# Patient Record
Sex: MALE
[De-identification: names, ages, dates, MRNs, and addresses within clinical notes are randomized per-mention and may not be internally consistent; named-entity substitution may affect disease eponyms.]

---

## 2017-03-14 ENCOUNTER — CHARTING TRANS (OUTPATIENT)
Dept: OTHER | Age: 32
End: 2017-03-14

## 2024-04-16 ENCOUNTER — OFFICE VISIT (OUTPATIENT)
Dept: OTOLARYNGOLOGY | Facility: CLINIC | Age: 39
End: 2024-04-16

## 2024-04-16 VITALS — WEIGHT: 315 LBS | BODY MASS INDEX: 40.43 KG/M2 | HEIGHT: 74 IN

## 2024-04-16 DIAGNOSIS — J34.3 HYPERTROPHY OF BOTH INFERIOR NASAL TURBINATES: ICD-10-CM

## 2024-04-16 DIAGNOSIS — J30.9 CHRONIC ALLERGIC RHINITIS: ICD-10-CM

## 2024-04-16 DIAGNOSIS — J34.2 NASAL SEPTAL DEVIATION: ICD-10-CM

## 2024-04-16 DIAGNOSIS — R06.83 SNORING: ICD-10-CM

## 2024-04-16 DIAGNOSIS — J34.89 NASAL OBSTRUCTION: ICD-10-CM

## 2024-04-16 DIAGNOSIS — R09.81 NASAL CONGESTION: ICD-10-CM

## 2024-04-16 DIAGNOSIS — J38.7 ULCER OF EPIGLOTTIS: Primary | ICD-10-CM

## 2024-04-16 PROCEDURE — 31575 DIAGNOSTIC LARYNGOSCOPY: CPT | Performed by: STUDENT IN AN ORGANIZED HEALTH CARE EDUCATION/TRAINING PROGRAM

## 2024-04-16 PROCEDURE — 99204 OFFICE O/P NEW MOD 45 MIN: CPT | Performed by: STUDENT IN AN ORGANIZED HEALTH CARE EDUCATION/TRAINING PROGRAM

## 2024-04-16 RX ORDER — MONTELUKAST SODIUM 10 MG/1
10 TABLET ORAL NIGHTLY
Qty: 30 TABLET | Refills: 3 | Status: SHIPPED | OUTPATIENT
Start: 2024-04-16

## 2024-04-16 RX ORDER — AZELASTINE 1 MG/ML
2 SPRAY, METERED NASAL 2 TIMES DAILY
Qty: 30 ML | Refills: 0 | Status: SHIPPED | OUTPATIENT
Start: 2024-04-16

## 2024-04-16 NOTE — PROGRESS NOTES
Nick Whatley is a 38 year old male.   Chief Complaint   Patient presents with    Throat Problem     Pt states persistent throat pain.       ASSESSMENT AND PLAN:   1. Ulcer of epiglottis  38-year-old presents with throat pain that has been going on for several weeks although recently getting much better.  Still feels a little soreness in the back of his throat noted he had ulcers that are now resolved and his mouth.  Reports chronic snoring and nasal congestion.  This is affecting his sleep his wife says that he snores.  He has been using Ocean mist nasal spray    On flexible laryngoscopy there is a superficial ulcer at the tip of his epiglottis on the left.  No other abnormal lesions seen of his oropharynx or larynx.  He has quite deviated septum to the right with a spur in contact with the middle turbinate.  Also has inferior turbinate hypertrophy on each side with evidence of allergic mucin    Appears that he may have had a viral laryngitis or pharyngitis that is mostly resolved still with a slight ulcer of his epiglottis.  Will currently observe.  Also has chronic snoring and nasal obstruction with regards to his septal deviation and turbinate hypertrophy.  Will trial a course of Singulair, Claritin-D and Astelin for likely contributing allergies.  Discussed the utility of surgery in detail and he can return in 3 to 4 weeks if still not improving to consider surgery    2. Snoring      3. Nasal septal deviation      4. Hypertrophy of both inferior nasal turbinates      5. Nasal congestion      6. Nasal obstruction    - loratadine-pseudoephedrine ER  MG Oral Tablet 24 Hr; Take 1 tablet by mouth at bedtime.  Dispense: 30 tablet; Refill: 1    7. Chronic allergic rhinitis        The patient indicates understanding of these issues and agrees to the plan.      EXAM:   Ht 6' 2\" (1.88 m)   Wt (!) 320 lb (145.2 kg)   BMI 41.09 kg/m²     Pertinent exam findings may also be noted above in assessment and plan      System Details   Skin Inspection - Normal.   Constitutional Overall appearance - Normal.   Head/Face Symmetric, TMJ tenderness not present    Eyes EOMI, PERRL   Right ear:  Canal clear, TM intact, no HARIS   Left ear:  Canal clear, TM intact, no HARIS   Nose: Septum midline, inferior turbinates not enlarged, nasal valves without collapse    Oral cavity/Oropharynx: No lesions or masses on inspection or palpation, tonsils symmetric    Neck: Soft without LAD, thyroid not enlarged  Voice clear/ no stridor   Other:      Scopes and Procedures:       Flexible Laryngoscopy Procedure Note (48066)    Due to inability for adequate examination of the larynx and need for magnification to perform the examination, endoscopy was performed.  Risks and benefits were discussed with patient/family and they have given verbal consent to proceed.    Pre-operative Diagnosis:   1. Ulcer of epiglottis    2. Snoring    3. Nasal septal deviation    4. Hypertrophy of both inferior nasal turbinates    5. Nasal congestion    6. Nasal obstruction    7. Chronic allergic rhinitis        Post-operative Diagnosis: Same    Procedure: Diagnostic flexible fiberoptic laryngoscopy    Anesthesia: Topical anesthetic [place]     Surgeon Jose Bean MD    EBL: 0cc    Procedure Detail & Findings:     After placement of topical anesthetic intranasally the flexible laryngoscope was inserted into the nare and driven through the nasal cavity with no significant abnormal findings noted in the nasal cavities or nasopharynx. The oropharynx, hypopharynx and larynx were subsequently examined and the following findings were noted:        Base of Tongue: Normal        Valeculla: Normal        Arytenoids: Normal/Erythemous: Normal        Introitus of the esophagus: Normal        Epiglottis: On flexible laryngoscopy there is a superficial ulcer at the tip of his epiglottis on the left.         False vocal cords: Normal        True vocal cords: Normal        Subglottic space:  Normal        Mobility of True vocal cords: Normal    Condition: Stable    Complications: Patient tolerated the procedure well with no immediate complication.    Jose Bean MD        Current Outpatient Medications   Medication Sig Dispense Refill    loratadine-pseudoephedrine ER  MG Oral Tablet 24 Hr Take 1 tablet by mouth at bedtime. 30 tablet 1    azelastine 0.1 % Nasal Solution 2 sprays by Nasal route 2 (two) times daily. 30 mL 0    montelukast 10 MG Oral Tab Take 1 tablet (10 mg total) by mouth nightly. 30 tablet 3      History reviewed. No pertinent past medical history.   Social History:  Social History     Socioeconomic History    Marital status: Unknown   Tobacco Use    Smoking status: Never    Smokeless tobacco: Never   Substance and Sexual Activity    Alcohol use: Not Currently          Jose Bean MD  4/16/2024  9:10 AM

## 2024-04-23 ENCOUNTER — TELEPHONE (OUTPATIENT)
Dept: SURGERY | Facility: CLINIC | Age: 39
End: 2024-04-23

## 2024-05-10 ENCOUNTER — OFFICE VISIT (OUTPATIENT)
Dept: SURGERY | Facility: CLINIC | Age: 39
End: 2024-05-10

## 2024-05-10 DIAGNOSIS — R31.0 GROSS HEMATURIA: Primary | ICD-10-CM

## 2024-05-10 PROCEDURE — 99203 OFFICE O/P NEW LOW 30 MIN: CPT | Performed by: UROLOGY

## 2024-05-10 NOTE — PROGRESS NOTES
Sheila Manning MD  Department of Urology  [address] [place] [address] [address]  [place] [address]    T: [phone]  F: [phone]    To: No primary care provider on file.   No primary provider on file.    Re: Nick Whatley   MRN: [id number]  : 10/24/1985    Dear No primary care provider on file.,    Today I had the pleasure of seeing Nick Whatley in my clinic. As you know, Mr. Whatley is a pleasant 38 year old year old male who I am seeing for hematuria. Patient was last seen in this department on Visit date not found.    Briefly, patient states that he saw blood in his urine.       PAST MEDICAL HISTORY:  No past medical history on file.     PAST SURGICAL HISTORY:  No past surgical history on file.      ALLERGIES:  No Known Allergies      MEDICATIONS:  Current Outpatient Medications   Medication Instructions    azelastine 0.1 % Nasal Solution 2 sprays, Nasal, 2 times daily    loratadine-pseudoephedrine ER  MG Oral Tablet 24 Hr 1 tablet, Oral, Nightly    montelukast (SINGULAIR) 10 mg, Oral, Nightly        FAMILY HISTORY:  No family history on file.     SOCIAL HISTORY:  Social History     Socioeconomic History    Marital status: Unknown   Tobacco Use    Smoking status: Never    Smokeless tobacco: Never   Substance and Sexual Activity    Alcohol use: Not Currently          PHYSICAL EXAMINATION:  There were no vitals filed for this visit.  CONSTITUTIONAL: No apparent distress, cooperative and communicative  NEUROLOGIC: Alert and oriented   HEAD: Normocephalic, atraumatic   EYES: Sclera non-icteric   ENT: Hearing intact, moist mucous membranes   NECK: No obvious goiter or masses   RESPIRATORY: Normal respiratory effort, Nonlabored breathing on room air  SKIN: No evident rashes   ABDOMEN: Soft, nontender, nondistended, no rebound tenderness, no guarding, no masses      REVIEW OF SYSTEMS:    A comprehensive 10-point review of systems was completed.  Pertinent positives and negatives are noted  in the the HPI.       LABORATORY DATA:  none        IMAGING REVIEW:  one     OTHER RELEVANT DATA:   none     IMPRESSION: Hematuria-recommended cystoscopy, CT urogram and cytology.    I discussed hematuria in detail both gross and microscopic.  I mentioned that it can come from anywhere the urine touches in the urinary tract including the kidneys, ureters, urethra, prostate in men, vagina/uterus in women.  I mention that there are benign causes like kidney stones, trauma, heavy exercise, idiopathic hematuria as well as more concerning causes like a kidney tumor, ureteral tumor or bladder tumor.        PLAN:  Cystoscopy  CT urogram  Cytology    Thank you for referring this very pleasant patient to my clinic. If you have any questions or concerns, please do not hesitate to contact me.    Sincerely,  Sheila Manning MD    30 minutes were spent on this patient at this visit obtaining a history, reviewing medical records, developing a treatment plan, counseling and discussing treatment strategy with patient, coordination of care and documentation.     The 21st Century Cures Act makes medical notes available to patients in the interest of transparency.  However, please be advised that this is a medical document.  It is intended as a peer to peer communication.  It is written in medical language and may contain abbreviations or verbiage that are technical and unfamiliar.  It may appear blunt or direct.  Medical documents are intended to carry relevant information, facts as evident, and the clinical opinion of the practitioner.

## 2024-05-13 ENCOUNTER — TELEPHONE (OUTPATIENT)
Dept: SURGERY | Facility: CLINIC | Age: 39
End: 2024-05-13

## 2024-05-13 LAB — NON GYNE INTERPRETATION: NEGATIVE

## 2024-05-13 NOTE — TELEPHONE ENCOUNTER
----- Message from Sheila Manning sent at 5/13/2024 10:39 AM CDT -----  Can you please tell patient his cytology is negative. We will discuss further at his cysto

## 2024-05-13 NOTE — TELEPHONE ENCOUNTER
Called patient today 2024 and patient verified Name and . Patient was given negative cytology urine sample results. Patient understood conversation.

## 2024-05-15 ENCOUNTER — TELEPHONE (OUTPATIENT)
Dept: SURGERY | Facility: CLINIC | Age: 39
End: 2024-05-15

## 2024-05-15 NOTE — TELEPHONE ENCOUNTER
Received approval for Cpt 80212 Ct scan of abdomen and pelvis from [facility].  Request ID: [id number]  Approval valid from/to: 05/14/2024 to 06/28/2024

## 2024-05-16 NOTE — TELEPHONE ENCOUNTER
Received fax from [facility] with approval of CPT: 94036 (3D) pictures. Approved from 5/15/24-6/29/24. Will send copy to scanning.

## 2024-05-22 ENCOUNTER — HOSPITAL ENCOUNTER (OUTPATIENT)
Dept: CT IMAGING | Age: 39
Discharge: HOME | End: 2024-05-22
Attending: UROLOGY

## 2024-05-22 ENCOUNTER — HOSPITAL ENCOUNTER (OUTPATIENT)
Dept: CT IMAGING | Facility: HOSPITAL | Age: 39
End: 2024-05-22
Attending: UROLOGY

## 2024-05-22 DIAGNOSIS — R31.0 GROSS HEMATURIA: ICD-10-CM

## 2024-05-22 LAB
CREAT BLD-MCNC: 1 MG/DL
EGFRCR SERPLBLD CKD-EPI 2021: 99 ML/MIN/1.73M2 (ref 60–?)

## 2024-05-22 PROCEDURE — 74178 CT ABD&PLV WO CNTR FLWD CNTR: CPT | Performed by: UROLOGY

## 2024-05-22 PROCEDURE — 82565 ASSAY OF CREATININE: CPT

## 2024-05-22 PROCEDURE — 76377 3D RENDER W/INTRP POSTPROCES: CPT | Performed by: UROLOGY

## 2024-06-04 ENCOUNTER — PROCEDURE (OUTPATIENT)
Dept: SURGERY | Facility: CLINIC | Age: 39
End: 2024-06-04

## 2024-06-04 VITALS
DIASTOLIC BLOOD PRESSURE: 80 MMHG | SYSTOLIC BLOOD PRESSURE: 124 MMHG | BODY MASS INDEX: 40.43 KG/M2 | WEIGHT: 315 LBS | HEIGHT: 74 IN

## 2024-06-04 DIAGNOSIS — R31.0 GROSS HEMATURIA: Primary | ICD-10-CM

## 2024-06-04 PROCEDURE — 52000 CYSTOURETHROSCOPY: CPT | Performed by: UROLOGY

## 2024-06-04 NOTE — PROCEDURES
CYSTOSCOPY (MALE)    PRE-OP DIAGNOSIS: gross hematuria    POST-OP DIAGNOSIS: same    PROCEDURE: Cystsocopy    SURGEON: Sheila Manning MD    ASSISTANT: none     EBL: minimal    FINDINGS:   Urethra: No meatal stenosis, no anterior or posterior urethral strictures, open bladder neck, no true stricture, but whitish area near the bulbar urethra through which the scope passed without any issues  Prostate: Bilobar coaptation with mild intravesical component   Bladder: Bilateral ureteral orifices in orthotopic position with efflux, no suspicious or concerning erythematous lesions, no papillary bladder tumors, no stones   Retroflexion: Mild intravesical component of prostate   Other findings: none    INDICATIONS: gross hematuria. Cytology negative. CTU negative other than punctate stones    PROCEDURE: Patient was brought to the procedure suite and a timeout was performed identifying the patient,  and procedure being performed.  The risks of the procedure were once again detailed to the patient including bleeding, infection, dysuria.  The patient agreed to proceed.  The patient had a negative urinalysis.  No antibiotics were given to patient prior to this procedure.     He was placed in a supine position on the table and a flexible cystoscope was inserted per urethra.  There were no obvious urethral strictures in the anterior, membranous or posterior urethra. no true stricture, but whitish area near the bulbar urethra through which the scope passed without any issues The prostate appeared small and nonobstructive.  Once in the bladder we performed a full diagnostic/surveillance cystoscopy which demonstrated no abnormalities.  On retroflexion we noted no abnormalities.  The scope was then carefully removed and once again no urethral abnormalities were noted.    There were no complications after this procedure and the patient tolerated the procedure without issue.    IMPRESSION: cysto negative    PLAN:    RTC prn

## 2024-06-19 ENCOUNTER — APPOINTMENT (OUTPATIENT)
Dept: URBAN - METROPOLITAN AREA CLINIC 242 | Age: 39
Setting detail: DERMATOLOGY
End: 2024-06-19

## 2024-06-19 DIAGNOSIS — L30.4 ERYTHEMA INTERTRIGO: ICD-10-CM

## 2024-06-19 DIAGNOSIS — L21.8 OTHER SEBORRHEIC DERMATITIS: ICD-10-CM

## 2024-06-19 DIAGNOSIS — L82.0 INFLAMED SEBORRHEIC KERATOSIS: ICD-10-CM

## 2024-06-19 PROCEDURE — OTHER COUNSELING: OTHER

## 2024-06-19 PROCEDURE — OTHER PRESCRIPTION: OTHER

## 2024-06-19 PROCEDURE — 17110 DESTRUCT B9 LESION 1-14: CPT

## 2024-06-19 PROCEDURE — 99204 OFFICE O/P NEW MOD 45 MIN: CPT | Mod: 25

## 2024-06-19 PROCEDURE — OTHER MIPS QUALITY: OTHER

## 2024-06-19 PROCEDURE — OTHER LIQUID NITROGEN: OTHER

## 2024-06-19 RX ORDER — FLUOCINONIDE 0.5 MG/ML
SOLUTION TOPICAL
Qty: 60 | Refills: 2 | Status: ERX | COMMUNITY
Start: 2024-06-19

## 2024-06-19 RX ORDER — KETOCONAZOLE 20 MG/G
CREAM TOPICAL
Qty: 30 | Refills: 0 | Status: ERX | COMMUNITY
Start: 2024-06-19

## 2024-06-19 RX ORDER — CLOBETASOL PROPIONATE 0.5 MG/G
AEROSOL, FOAM TOPICAL
Qty: 50 | Refills: 2 | Status: ERX | COMMUNITY
Start: 2024-06-19

## 2024-06-19 RX ORDER — PIMECROLIMUS 10 MG/G
CREAM TOPICAL
Qty: 60 | Refills: 0 | Status: ERX | COMMUNITY
Start: 2024-06-19

## 2024-06-19 RX ORDER — DESONIDE 0.5 MG/G
CREAM TOPICAL
Qty: 60 | Refills: 1 | Status: ERX | COMMUNITY
Start: 2024-06-19

## 2024-06-19 RX ORDER — KETOCONAZOLE 20 MG/ML
SHAMPOO, SUSPENSION TOPICAL
Qty: 120 | Refills: 2 | Status: ERX | COMMUNITY
Start: 2024-06-19

## 2024-06-19 ASSESSMENT — LOCATION DETAILED DESCRIPTION DERM
LOCATION DETAILED: LEFT ANTERIOR PROXIMAL THIGH
LOCATION DETAILED: RIGHT SUPERIOR HELIX
LOCATION DETAILED: RIGHT INGUINAL CREASE
LOCATION DETAILED: LEFT SUPERIOR HELIX
LOCATION DETAILED: LEFT LATERAL ABDOMEN
LOCATION DETAILED: LEFT LOWER CUTANEOUS LIP

## 2024-06-19 ASSESSMENT — LOCATION ZONE DERM
LOCATION ZONE: LEG
LOCATION ZONE: EAR
LOCATION ZONE: LIP
LOCATION ZONE: TRUNK

## 2024-06-19 ASSESSMENT — LOCATION SIMPLE DESCRIPTION DERM
LOCATION SIMPLE: RIGHT EAR
LOCATION SIMPLE: LEFT THIGH
LOCATION SIMPLE: LEFT EAR
LOCATION SIMPLE: LEFT LIP
LOCATION SIMPLE: GROIN
LOCATION SIMPLE: ABDOMEN

## 2024-06-19 NOTE — PROCEDURE: LIQUID NITROGEN
Consent: The patient's consent was obtained including but not limited to risks of crusting, scabbing, blistering, scarring, darker or lighter pigmentary change, recurrence, incomplete removal and infection.
Spray Paint Text: The liquid nitrogen was applied to the skin utilizing a spray paint frosting technique.
Add 52 Modifier (Optional): no
Medical Necessity Clause: This procedure was medically necessary because the lesions that were treated were:
Number Of Freeze-Thaw Cycles: 3 freeze-thaw cycles
Detail Level: Detailed
Duration Of Freeze Thaw-Cycle (Seconds): 3
Show Applicator Variable?: Yes
Post-Care Instructions: I reviewed with the patient in detail post-care instructions. Patient is to wear sunprotection, and avoid picking at any of the treated lesions. Pt may apply Vaseline to crusted or scabbing areas.
Medical Necessity Information: It is in your best interest to select a reason for this procedure from the list below. All of these items fulfill various CMS LCD requirements except the new and changing color options.
Application Tool (Optional): Liquid Nitrogen Sprayer

## 2024-08-26 ENCOUNTER — LAB SERVICES (OUTPATIENT)
Dept: LAB | Age: 39
End: 2024-08-26

## 2024-08-26 ENCOUNTER — APPOINTMENT (OUTPATIENT)
Dept: FAMILY MEDICINE | Age: 39
End: 2024-08-26

## 2024-08-26 VITALS
WEIGHT: 315 LBS | HEIGHT: 74 IN | DIASTOLIC BLOOD PRESSURE: 76 MMHG | OXYGEN SATURATION: 96 % | HEART RATE: 85 BPM | SYSTOLIC BLOOD PRESSURE: 109 MMHG | RESPIRATION RATE: 16 BRPM | TEMPERATURE: 97.2 F | BODY MASS INDEX: 40.43 KG/M2

## 2024-08-26 DIAGNOSIS — M25.572 CHRONIC PAIN OF LEFT ANKLE: ICD-10-CM

## 2024-08-26 DIAGNOSIS — L40.9 PSORIASIS: ICD-10-CM

## 2024-08-26 DIAGNOSIS — E66.01 MORBID OBESITY WITH BMI OF 40.0-44.9, ADULT  (CMD): ICD-10-CM

## 2024-08-26 DIAGNOSIS — Z23 IMMUNIZATION DUE: ICD-10-CM

## 2024-08-26 DIAGNOSIS — Z00.00 WELL ADULT EXAM: Primary | ICD-10-CM

## 2024-08-26 DIAGNOSIS — R00.2 HEART PALPITATIONS: ICD-10-CM

## 2024-08-26 DIAGNOSIS — G89.29 CHRONIC PAIN OF LEFT ANKLE: ICD-10-CM

## 2024-08-26 PROBLEM — Z85.72 HISTORY OF DIFFUSE LARGE B-CELL LYMPHOMA: Status: ACTIVE | Noted: 2024-08-26

## 2024-08-26 LAB — HBA1C MFR BLD: 5 % (ref 4.5–5.6)

## 2024-08-26 PROCEDURE — 99204 OFFICE O/P NEW MOD 45 MIN: CPT | Performed by: STUDENT IN AN ORGANIZED HEALTH CARE EDUCATION/TRAINING PROGRAM

## 2024-08-26 PROCEDURE — 36415 COLL VENOUS BLD VENIPUNCTURE: CPT | Performed by: STUDENT IN AN ORGANIZED HEALTH CARE EDUCATION/TRAINING PROGRAM

## 2024-08-26 PROCEDURE — 83036 HEMOGLOBIN GLYCOSYLATED A1C: CPT | Performed by: CLINICAL MEDICAL LABORATORY

## 2024-08-26 PROCEDURE — 80053 COMPREHEN METABOLIC PANEL: CPT | Performed by: CLINICAL MEDICAL LABORATORY

## 2024-08-26 PROCEDURE — 99385 PREV VISIT NEW AGE 18-39: CPT | Performed by: STUDENT IN AN ORGANIZED HEALTH CARE EDUCATION/TRAINING PROGRAM

## 2024-08-26 PROCEDURE — 90677 PCV20 VACCINE IM: CPT | Performed by: STUDENT IN AN ORGANIZED HEALTH CARE EDUCATION/TRAINING PROGRAM

## 2024-08-26 ASSESSMENT — PATIENT HEALTH QUESTIONNAIRE - PHQ9
SUM OF ALL RESPONSES TO PHQ9 QUESTIONS 1 AND 2: 0
10. IF YOU CHECKED OFF ANY PROBLEMS, HOW DIFFICULT HAVE THESE PROBLEMS MADE IT FOR YOU TO DO YOUR WORK, TAKE CARE OF THINGS AT HOME, OR GET ALONG WITH OTHER PEOPLE: NOT DIFFICULT AT ALL
4. FEELING TIRED OR HAVING LITTLE ENERGY: NOT AT ALL
2. FEELING DOWN, DEPRESSED OR HOPELESS: NOT AT ALL
CLINICAL INTERPRETATION OF PHQ2 SCORE: NO FURTHER SCREENING NEEDED
8. MOVING OR SPEAKING SO SLOWLY THAT OTHER PEOPLE COULD HAVE NOTICED. OR THE OPPOSITE, BEING SO FIGETY OR RESTLESS THAT YOU HAVE BEEN MOVING AROUND A LOT MORE THAN USUAL: NOT AT ALL
7. TROUBLE CONCENTRATING ON THINGS, SUCH AS READING THE NEWSPAPER OR WATCHING TELEVISION: NOT AT ALL
3. TROUBLE FALLING OR STAYING ASLEEP OR SLEEPING TOO MUCH: NOT AT ALL
9. THOUGHTS THAT YOU WOULD BE BETTER OFF DEAD, OR OF HURTING YOURSELF: NOT AT ALL
1. LITTLE INTEREST OR PLEASURE IN DOING THINGS: NOT AT ALL
5. POOR APPETITE, WEIGHT LOSS, OR OVEREATING: NOT AT ALL
SUM OF ALL RESPONSES TO PHQ QUESTIONS 1-9: 0
SUM OF ALL RESPONSES TO PHQ9 QUESTIONS 1 AND 2: 0
6. FEELING BAD ABOUT YOURSELF - OR THAT YOU ARE A FAILURE OR HAVE LET YOURSELF OR YOUR FAMILY DOWN: NOT AT ALL

## 2024-08-26 ASSESSMENT — ANXIETY QUESTIONNAIRES
5. BEING SO RESTLESS THAT IT IS HARD TO SIT STILL: NOT AT ALL
IF YOU CHECKED OFF ANY PROBLEMS ON THIS QUESTIONNAIRE, HOW DIFFICULT HAVE THESE PROBLEMS MADE IT FOR YOU TO DO YOUR WORK, TAKE CARE OF THINGS AT HOME, OR GET ALONG WITH OTHER PEOPLE: NOT DIFFICULT AT ALL
2. NOT BEING ABLE TO STOP OR CONTROL WORRYING: 0
1. FEELING NERVOUS, ANXIOUS, OR ON EDGE: 0
6. BECOMING EASILY ANNOYED OR IRRITABLE: 0
7. FEELING AFRAID AS IF SOMETHING AWFUL MIGHT HAPPEN: 0
3. WORRYING TOO MUCH ABOUT DIFFERENT THINGS: NOT AT ALL
2. NOT BEING ABLE TO STOP OR CONTROL WORRYING: NOT AT ALL
5. BEING SO RESTLESS THAT IT IS HARD TO SIT STILL: 0
4. TROUBLE RELAXING: 0
4. TROUBLE RELAXING: NOT AT ALL
7. FEELING AFRAID AS IF SOMETHING AWFUL MIGHT HAPPEN: NOT AT ALL
6. BECOMING EASILY ANNOYED OR IRRITABLE: NOT AT ALL
GAD7 TOTAL SCORE: 0
3. WORRYING TOO MUCH ABOUT DIFFERENT THINGS: 0
1. FEELING NERVOUS, ANXIOUS, OR ON EDGE: NOT AT ALL

## 2024-08-26 ASSESSMENT — PAIN SCALES - GENERAL: PAINLEVEL: 0

## 2024-08-27 LAB
ALBUMIN SERPL-MCNC: 4.1 G/DL (ref 3.6–5.1)
ALBUMIN/GLOB SERPL: 1.3 {RATIO} (ref 1–2.4)
ALP SERPL-CCNC: 62 UNITS/L (ref 45–117)
ALT SERPL-CCNC: 60 UNITS/L
ANION GAP SERPL CALC-SCNC: 8 MMOL/L (ref 7–19)
AST SERPL-CCNC: 35 UNITS/L
BILIRUB SERPL-MCNC: 0.7 MG/DL (ref 0.2–1)
BUN SERPL-MCNC: 17 MG/DL (ref 6–20)
BUN/CREAT SERPL: 17 (ref 7–25)
CALCIUM SERPL-MCNC: 9.9 MG/DL (ref 8.4–10.2)
CHLORIDE SERPL-SCNC: 104 MMOL/L (ref 97–110)
CO2 SERPL-SCNC: 31 MMOL/L (ref 21–32)
CREAT SERPL-MCNC: 1 MG/DL (ref 0.67–1.17)
EGFRCR SERPLBLD CKD-EPI 2021: >90 ML/MIN/{1.73_M2}
FASTING DURATION TIME PATIENT: 0 HOURS (ref 0–999)
GLOBULIN SER-MCNC: 3.1 G/DL (ref 2–4)
GLUCOSE SERPL-MCNC: 108 MG/DL (ref 70–99)
POTASSIUM SERPL-SCNC: 4.4 MMOL/L (ref 3.4–5.1)
PROT SERPL-MCNC: 7.2 G/DL (ref 6.4–8.2)
SODIUM SERPL-SCNC: 139 MMOL/L (ref 135–145)

## 2025-07-29 ENCOUNTER — APPOINTMENT (OUTPATIENT)
Dept: URBAN - METROPOLITAN AREA CLINIC 244 | Age: 40
Setting detail: DERMATOLOGY
End: 2025-07-30

## 2025-07-29 DIAGNOSIS — L82.1 OTHER SEBORRHEIC KERATOSIS: ICD-10-CM

## 2025-07-29 DIAGNOSIS — L91.8 OTHER HYPERTROPHIC DISORDERS OF THE SKIN: ICD-10-CM

## 2025-07-29 DIAGNOSIS — Q819 OTHER SPECIFIED ANOMALIES OF SKIN: ICD-10-CM

## 2025-07-29 DIAGNOSIS — Q826 OTHER SPECIFIED ANOMALIES OF SKIN: ICD-10-CM

## 2025-07-29 DIAGNOSIS — L73.9 FOLLICULAR DISORDER, UNSPECIFIED: ICD-10-CM

## 2025-07-29 DIAGNOSIS — L84 CORNS AND CALLOSITIES: ICD-10-CM

## 2025-07-29 DIAGNOSIS — L40.0 PSORIASIS VULGARIS: ICD-10-CM

## 2025-07-29 DIAGNOSIS — Q828 OTHER SPECIFIED ANOMALIES OF SKIN: ICD-10-CM

## 2025-07-29 DIAGNOSIS — L21.8 OTHER SEBORRHEIC DERMATITIS: ICD-10-CM

## 2025-07-29 DIAGNOSIS — B07.8 OTHER VIRAL WARTS: ICD-10-CM

## 2025-07-29 PROBLEM — L85.8 OTHER SPECIFIED EPIDERMAL THICKENING: Status: ACTIVE | Noted: 2025-07-29

## 2025-07-29 PROCEDURE — OTHER OTC TREATMENT REGIMEN: OTHER

## 2025-07-29 PROCEDURE — OTHER COUNSELING: OTHER

## 2025-07-29 PROCEDURE — OTHER LIQUID NITROGEN: OTHER

## 2025-07-29 PROCEDURE — OTHER PRESCRIPTION MEDICATION MANAGEMENT: OTHER

## 2025-07-29 ASSESSMENT — LOCATION DETAILED DESCRIPTION DERM
LOCATION DETAILED: LEFT ULNAR PALM
LOCATION DETAILED: RIGHT INFERIOR ANTERIOR NECK
LOCATION DETAILED: RIGHT POSTAURICULAR CREASE
LOCATION DETAILED: LEFT RADIAL PALM
LOCATION DETAILED: RIGHT PROXIMAL DORSAL FOREARM
LOCATION DETAILED: LEFT INFERIOR LATERAL NECK
LOCATION DETAILED: LEFT RIB CAGE
LOCATION DETAILED: LEFT RIB CAGE
LOCATION DETAILED: LEFT DORSAL 5TH TOE
LOCATION DETAILED: LEFT LATERAL INFERIOR CHEST
LOCATION DETAILED: LEFT PLANTAR FOREFOOT OVERLYING 3RD METATARSAL
LOCATION DETAILED: LEFT PROXIMAL DORSAL FOREARM
LOCATION DETAILED: LEFT MEDIAL SUPERIOR CHEST
LOCATION DETAILED: RIGHT POSTAURICULAR CREASE
LOCATION DETAILED: LEFT CLAVICULAR NECK
LOCATION DETAILED: RIGHT CLAVICULAR NECK
LOCATION DETAILED: STERNUM
LOCATION DETAILED: LEFT INFERIOR ANTERIOR NECK
LOCATION DETAILED: LEFT ANTERIOR MEDIAL PROXIMAL UPPER ARM
LOCATION DETAILED: LEFT POSTAURICULAR SKIN
LOCATION DETAILED: LEFT VENTRAL PROXIMAL FOREARM
LOCATION DETAILED: LEFT ULNAR PALM
LOCATION DETAILED: LEFT PLANTAR FOREFOOT OVERLYING 3RD METATARSAL
LOCATION DETAILED: RIGHT ANTERIOR MEDIAL PROXIMAL UPPER ARM
LOCATION DETAILED: LEFT MEDIAL FRONTAL SCALP
LOCATION DETAILED: RIGHT LATERAL INFERIOR CHEST
LOCATION DETAILED: LEFT ANTERIOR MEDIAL PROXIMAL UPPER ARM
LOCATION DETAILED: LEFT THENAR EMINENCE
LOCATION DETAILED: RIGHT SUPERIOR MEDIAL UPPER BACK
LOCATION DETAILED: RIGHT SUPERIOR MEDIAL MALAR CHEEK
LOCATION DETAILED: NASAL DORSUM
LOCATION DETAILED: LEFT PROXIMAL PALMAR MIDDLE FINGER
LOCATION DETAILED: RIGHT DISTAL VENTRAL THUMB
LOCATION DETAILED: RIGHT DISTAL VENTRAL THUMB
LOCATION DETAILED: RIGHT SUPERIOR MEDIAL UPPER BACK
LOCATION DETAILED: LEFT ANTECUBITAL SKIN
LOCATION DETAILED: LEFT MEDIAL FRONTAL SCALP
LOCATION DETAILED: RIGHT RIB CAGE
LOCATION DETAILED: RIGHT LATERAL INFERIOR CHEST
LOCATION DETAILED: RIGHT RADIAL PALM
LOCATION DETAILED: LEFT POSTAURICULAR SKIN
LOCATION DETAILED: LEFT LATERAL INFERIOR CHEST
LOCATION DETAILED: RIGHT RADIAL PALM
LOCATION DETAILED: NASAL DORSUM
LOCATION DETAILED: RIGHT ANTERIOR MEDIAL PROXIMAL UPPER ARM

## 2025-07-29 ASSESSMENT — LOCATION ZONE DERM
LOCATION ZONE: HAND
LOCATION ZONE: FEET
LOCATION ZONE: FINGER
LOCATION ZONE: TOE
LOCATION ZONE: ARM
LOCATION ZONE: NOSE
LOCATION ZONE: NECK
LOCATION ZONE: TRUNK
LOCATION ZONE: HAND
LOCATION ZONE: FACE
LOCATION ZONE: EAR
LOCATION ZONE: NOSE
LOCATION ZONE: FINGER
LOCATION ZONE: SCALP
LOCATION ZONE: TRUNK
LOCATION ZONE: FEET
LOCATION ZONE: EAR
LOCATION ZONE: SCALP
LOCATION ZONE: ARM

## 2025-07-29 ASSESSMENT — LOCATION SIMPLE DESCRIPTION DERM
LOCATION SIMPLE: RIGHT CHEEK
LOCATION SIMPLE: LEFT ELBOW
LOCATION SIMPLE: LEFT UPPER ARM
LOCATION SIMPLE: LEFT FOREARM
LOCATION SIMPLE: RIGHT THUMB
LOCATION SIMPLE: RIGHT EAR
LOCATION SIMPLE: LEFT SCALP
LOCATION SIMPLE: LEFT PLANTAR SURFACE
LOCATION SIMPLE: LEFT ANTERIOR NECK
LOCATION SIMPLE: LEFT SCALP
LOCATION SIMPLE: NOSE
LOCATION SIMPLE: LEFT HAND
LOCATION SIMPLE: LEFT MIDDLE FINGER
LOCATION SIMPLE: LEFT UPPER ARM
LOCATION SIMPLE: RIGHT EAR
LOCATION SIMPLE: RIGHT THUMB
LOCATION SIMPLE: RIGHT ANTERIOR NECK
LOCATION SIMPLE: CHEST
LOCATION SIMPLE: POSTERIOR SCALP
LOCATION SIMPLE: RIGHT FOREARM
LOCATION SIMPLE: LEFT PLANTAR SURFACE
LOCATION SIMPLE: POSTERIOR SCALP
LOCATION SIMPLE: RIGHT UPPER ARM
LOCATION SIMPLE: RIGHT HAND
LOCATION SIMPLE: RIGHT HAND
LOCATION SIMPLE: NOSE
LOCATION SIMPLE: LEFT 5TH TOE
LOCATION SIMPLE: RIGHT UPPER ARM
LOCATION SIMPLE: ABDOMEN
LOCATION SIMPLE: RIGHT UPPER BACK
LOCATION SIMPLE: CHEST
LOCATION SIMPLE: RIGHT UPPER BACK
LOCATION SIMPLE: ABDOMEN
LOCATION SIMPLE: LEFT HAND